# Patient Record
Sex: MALE | Race: WHITE | URBAN - METROPOLITAN AREA
[De-identification: names, ages, dates, MRNs, and addresses within clinical notes are randomized per-mention and may not be internally consistent; named-entity substitution may affect disease eponyms.]

---

## 2018-01-05 ENCOUNTER — OFFICE VISIT (OUTPATIENT)
Dept: FAMILY MEDICINE CLINIC | Age: 43
End: 2018-01-05

## 2018-01-05 VITALS
SYSTOLIC BLOOD PRESSURE: 110 MMHG | WEIGHT: 155 LBS | DIASTOLIC BLOOD PRESSURE: 80 MMHG | BODY MASS INDEX: 21.02 KG/M2 | TEMPERATURE: 98.5 F

## 2018-01-05 DIAGNOSIS — R05.9 COUGH: Primary | ICD-10-CM

## 2018-01-05 PROCEDURE — 94640 AIRWAY INHALATION TREATMENT: CPT | Performed by: FAMILY MEDICINE

## 2018-01-05 PROCEDURE — 96372 THER/PROPH/DIAG INJ SC/IM: CPT | Performed by: FAMILY MEDICINE

## 2018-01-05 PROCEDURE — 99213 OFFICE O/P EST LOW 20 MIN: CPT | Performed by: FAMILY MEDICINE

## 2018-01-05 RX ORDER — ALBUTEROL SULFATE 2.5 MG/3ML
2.5 SOLUTION RESPIRATORY (INHALATION) ONCE
Status: COMPLETED | OUTPATIENT
Start: 2018-01-05 | End: 2018-01-05

## 2018-01-05 RX ORDER — AZITHROMYCIN 250 MG/1
TABLET, FILM COATED ORAL
Qty: 6 TABLET | Refills: 0 | Status: SHIPPED | OUTPATIENT
Start: 2018-01-05 | End: 2018-01-09

## 2018-01-05 RX ORDER — METHYLPREDNISOLONE ACETATE 80 MG/ML
80 INJECTION, SUSPENSION INTRA-ARTICULAR; INTRALESIONAL; INTRAMUSCULAR; SOFT TISSUE ONCE
Status: COMPLETED | OUTPATIENT
Start: 2018-01-05 | End: 2018-01-05

## 2018-01-05 RX ADMIN — METHYLPREDNISOLONE ACETATE 80 MG: 80 INJECTION, SUSPENSION INTRA-ARTICULAR; INTRALESIONAL; INTRAMUSCULAR; SOFT TISSUE at 11:48

## 2018-01-05 RX ADMIN — ALBUTEROL SULFATE 2.5 MG: 2.5 SOLUTION RESPIRATORY (INHALATION) at 11:52

## 2018-01-05 ASSESSMENT — ENCOUNTER SYMPTOMS
SHORTNESS OF BREATH: 1
SINUS PAIN: 1
WHEEZING: 1
RHINORRHEA: 1
COUGH: 1

## 2018-01-05 NOTE — PROGRESS NOTES
He has decreased breath sounds (slightly at bases). He has no wheezes. He has no rhonchi. He has no rales. Lymphadenopathy:     He has no cervical adenopathy. Neurological: He is alert. Assessment/Plan    1. Cough  Since symptoms have somewhat improved since last weekend, I think it's reasonable to monitor for the next 24-48 hours. I did print Zithromax in case of worsening of symptoms. He did do a steroid injection today as well as an albuterol treatment to help with the inflammation of the chest. If worsening or not improved after starting the antibiotic let us know. - [facility] PRESSURIZED/NONPRESSURIZED INHALATION TREATMENT      Return if symptoms worsen or fail to improve.

## 2018-01-20 ENCOUNTER — OFFICE VISIT (OUTPATIENT)
Dept: FAMILY MEDICINE CLINIC | Age: 43
End: 2018-01-20

## 2018-01-20 VITALS
BODY MASS INDEX: 20.75 KG/M2 | DIASTOLIC BLOOD PRESSURE: 72 MMHG | SYSTOLIC BLOOD PRESSURE: 112 MMHG | WEIGHT: 153 LBS | HEART RATE: 83 BPM | OXYGEN SATURATION: 98 %

## 2018-01-20 DIAGNOSIS — M79.89 SWELLING OF LEFT HAND: Primary | ICD-10-CM

## 2018-01-20 PROCEDURE — 99213 OFFICE O/P EST LOW 20 MIN: CPT | Performed by: FAMILY MEDICINE

## 2018-01-20 PROCEDURE — 90471 IMMUNIZATION ADMIN: CPT | Performed by: FAMILY MEDICINE

## 2018-01-20 PROCEDURE — 90715 TDAP VACCINE 7 YRS/> IM: CPT | Performed by: FAMILY MEDICINE

## 2018-01-20 RX ORDER — NAPROXEN 500 MG/1
500 TABLET ORAL 2 TIMES DAILY WITH MEALS
Qty: 60 TABLET | Refills: 0 | Status: SHIPPED | OUTPATIENT
Start: 2018-01-20

## 2018-01-20 NOTE — PROGRESS NOTES
Subjective:      Patient ID: Marlen Peterson is a 36 y.o. male. HPI Patient is here for left hand swelling and pain times 2 days. Patient states on Thursday 2 days ago he had a burning like pain noted in his left hand. 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 He reports that the pain was on the dorsum of the hand. He recalls no trauma. He recalls no weakness. He recalls no joint swelling. On Friday he states the pain seemed to be less. He noted significant swelling of the hand and notes tightness in the hand as he tries to flex and make a fist. He's never had anything like this before. He is unaware of there being any arthritic conditions or gout in  the family  There is no other joint pain that is been going on there is been no weight loss    Review of Systems    Objective:   Physical Exam   Constitutional: He is oriented to person, place, and time. He appears well-developed and well-nourished. Musculoskeletal:   There is swelling of the hand from the wrist to the metacarpal joints on the left side, there is no tenderness of the wrist or the MCPs, there is no redness. I see no bruising present   Lymphadenopathy:        Left axillary: No lateral adenopathy present. Neurological: He is alert and oriented to person, place, and time. Assessment:      1. Swelling of left hand  CBC Auto Differential    Sedimentation Rate    C-reactive protein    Uric Acid             Plan:      Symptoms suggest a spontaneous rupture of a vein deep in the hand or potentially a tenosynovitis, plan is for ice elevation blood work. Naproxen 500 mg twice a day with food, Benefits and side effects of medication discussed with patient, may need to be off work for a couple days if the pain and the swelling does not resolve by Monday.  Tdap updated